# Patient Record
Sex: MALE | Race: WHITE | NOT HISPANIC OR LATINO | URBAN - METROPOLITAN AREA
[De-identification: names, ages, dates, MRNs, and addresses within clinical notes are randomized per-mention and may not be internally consistent; named-entity substitution may affect disease eponyms.]

---

## 2020-06-17 ENCOUNTER — OFFICE (OUTPATIENT)
Dept: URBAN - METROPOLITAN AREA TELEHEALTH 7 | Facility: TELEHEALTH | Age: 55
End: 2020-06-17
Payer: MEDICAID

## 2020-06-17 VITALS — WEIGHT: 198 LBS | HEIGHT: 71 IN

## 2020-06-17 DIAGNOSIS — D72.829 ELEVATED WHITE BLOOD CELL COUNT, UNSPECIFIED: ICD-10-CM

## 2020-06-17 DIAGNOSIS — R93.3 ABNORMAL FINDINGS ON DIAGNOSTIC IMAGING OF OTHER PARTS OF DI: ICD-10-CM

## 2020-06-17 PROCEDURE — 99204 OFFICE O/P NEW MOD 45 MIN: CPT | Mod: 95 | Performed by: INTERNAL MEDICINE

## 2020-06-17 NOTE — SERVICEHPINOTES
PATIENT VERIFIED BY DATE OF BIRTH AND NAME. Patient has been consented for this telecommunication visit. Patient lives in a group home and visit was carried out with patient with the assistance of his manager Mode who is present for the entire visit. On 6/6, he found a bottle of pancake syrup, and drank some amount and shortly afterward had vomitting so was bought to [facility].  He had an abdominal xray which demonstrated a potential bowel obstruction as well as an elevated WBC to 15.  He was then d/c home with zofran.  AS per Mode he stopped having any nausea or emesis- has been having regular bowel movements last was yesterday and was normal brown bowel movements without any blood in his bowel movements.  Says eating normally without any nausea, emesis, abdominal pain, constipation or diarrhea.   Spoke to Irene, who helps to care for patient at group home and she indicated that he was scheduled to have follow up bloodwork later this month. Looked in the computer and reports last Zofran dose was on 6/6.   Says does not take immodium (written as prn order since he is in a group home), but that he in fact takes miralax twice a day for many years due to constipation which has changed since his last colonosocpy in 2015 (done due to constipation and ?family history of colon cancer).  NO blood in stool, constipation or diarrhea.

## 2021-12-02 NOTE — SERVICENOTES
Visit was 45 minutes long., Patient's visit was conducted through video telecommunication. Patient consented before the start of visit as to understanding of privacy concerns, possible technological failure, and their responsibility of carrying out instructions of plan. Satisfactory